# Patient Record
Sex: MALE | Race: BLACK OR AFRICAN AMERICAN | NOT HISPANIC OR LATINO | URBAN - METROPOLITAN AREA
[De-identification: names, ages, dates, MRNs, and addresses within clinical notes are randomized per-mention and may not be internally consistent; named-entity substitution may affect disease eponyms.]

---

## 2023-01-01 ENCOUNTER — HOSPITAL ENCOUNTER (EMERGENCY)
Facility: HOSPITAL | Age: 38
End: 2023-12-21
Attending: EMERGENCY MEDICINE
Payer: MEDICAID

## 2023-01-01 DIAGNOSIS — I46.9 CARDIAC ARREST (MULTI): Primary | ICD-10-CM

## 2023-01-01 LAB
ANION GAP BLDV CALCULATED.4IONS-SCNC: 25 MMOL/L (ref 10–25)
BASE EXCESS BLDV CALC-SCNC: -20 MMOL/L (ref -2–3)
BODY TEMPERATURE: 37 DEGREES CELSIUS
CA-I BLDV-SCNC: 1.17 MMOL/L (ref 1.1–1.33)
CHLORIDE BLDV-SCNC: 99 MMOL/L (ref 98–107)
GLUCOSE BLDV-MCNC: 417 MG/DL (ref 74–99)
HCO3 BLDV-SCNC: 15.4 MMOL/L (ref 22–26)
HCT VFR BLD EST: 44 % (ref 41–52)
HGB BLDV-MCNC: 14.8 G/DL (ref 13.5–17.5)
LACTATE BLDV-SCNC: 14.7 MMOL/L (ref 0.4–2)
OXYHGB MFR BLDV: 12.2 % (ref 45–75)
PCO2 BLDV: 88 MM HG (ref 41–51)
PH BLDV: 6.85 PH (ref 7.33–7.43)
PO2 BLDV: 21 MM HG (ref 35–45)
POTASSIUM BLDV-SCNC: 3 MMOL/L (ref 3.5–5.3)
SAO2 % BLDV: 13 % (ref 45–75)
SODIUM BLDV-SCNC: 136 MMOL/L (ref 136–145)

## 2023-01-01 PROCEDURE — 99285 EMERGENCY DEPT VISIT HI MDM: CPT | Performed by: EMERGENCY MEDICINE

## 2023-01-01 PROCEDURE — 2500000004 HC RX 250 GENERAL PHARMACY W/ HCPCS (ALT 636 FOR OP/ED): Performed by: STUDENT IN AN ORGANIZED HEALTH CARE EDUCATION/TRAINING PROGRAM

## 2023-01-01 PROCEDURE — 84132 ASSAY OF SERUM POTASSIUM: CPT

## 2023-01-01 PROCEDURE — 99291 CRITICAL CARE FIRST HOUR: CPT | Mod: 25 | Performed by: EMERGENCY MEDICINE

## 2023-01-01 PROCEDURE — 94681 O2 UPTK CO2 OUTP % O2 XTRC: CPT

## 2023-01-01 PROCEDURE — 92950 HEART/LUNG RESUSCITATION CPR: CPT | Performed by: EMERGENCY MEDICINE

## 2023-01-01 PROCEDURE — 94762 N-INVAS EAR/PLS OXIMTRY CONT: CPT

## 2023-01-01 RX ORDER — INDOMETHACIN 25 MG/1
CAPSULE ORAL CODE/TRAUMA/SEDATION MEDICATION
Status: COMPLETED | OUTPATIENT
Start: 2023-01-01 | End: 2023-01-01

## 2023-01-01 RX ORDER — EPINEPHRINE 0.1 MG/ML
INJECTION INTRACARDIAC; INTRAVENOUS CODE/TRAUMA/SEDATION MEDICATION
Status: COMPLETED | OUTPATIENT
Start: 2023-01-01 | End: 2023-01-01

## 2023-01-01 RX ADMIN — SODIUM BICARBONATE 50 MEQ: 84 INJECTION, SOLUTION INTRAVENOUS at 06:32

## 2023-01-01 RX ADMIN — EPINEPHRINE 1 MG: 0.1 INJECTION INTRAVENOUS at 06:26

## 2023-01-01 RX ADMIN — EPINEPHRINE 1 MG: 0.1 INJECTION INTRAVENOUS at 06:31

## 2023-12-21 NOTE — ED TRIAGE NOTES
Pt presents as a full arrest - per EMS pt was called to 9-1-1 as a diabetic unresponsive emergency. Upon their arrival patient was agonal breathing and unresponsive in asystole. Pt was intubated 7.5 @ 23cm, 18g RAC,

## 2023-12-21 NOTE — ED PROVIDER NOTES
HPI:  Domingo Alfonso is a 38 y.o. who is brought in by EMS in cardiac arrest. Per EMS report, initially received a call for an unresponsive person.  Patient was in full arrest on their arrival which was approximately 30 minutes after the initial call.  Per EMS report, ACLS was initiated with chest compressions and 4 rounds of epinephrine and patient intubated in the field.  Remained in asystole throughout transport.  No additional information was able to be obtained.    Additional history was limited due to patient in cardiac arrest and no immediate family present    PMHx: unknown  PSHx: unknown   Social Hx: unknown  Allergies: unknown    ROS:   Patient unable to provide due to clinical condition    EXAM:  - Gen: unconscious, nonresponsive to noxious stimuli  - HEENT: NCAT; pupils fixed/dilated; neck is soft/supple; ET tube in place, dried vomitus around the mouth.  - CV: Chest compressions are ongoing, >2 sec cap refill bilateral upper extremities  - Pulm: +BVM with fair air exchange bilaterally, no spontaneous respirations  - GI: no bruising, no ecchymosis  - : not obtainable  - Extremities: no BLE edema  - Skin: warm, dry,   - Neuro: GCS 3, not obtainable    Diagnoses as of 12/21/23 0745   Cardiac arrest (CMS/[facility])         ED COURSE / MDM:   This is a 38 y.o. male presenting to the ED in cardiac arrest. ACLS was continued here in the ED. patient remained in PEA versus asystole with no cardiac activity on point-of-care ultrasound.  End-tidal CO2 confirmed intubation and remained approximately 10 throughout the code.  Patient had no signs of cardiac tamponade, or tension pneumothorax.  Patient not hypothermic.  No signs of dialysis access and potassium normal on venous blood gas.  Patient had significant acidosis with a pH of 6.85.  Patient remained in asystole with no reversible causes found. Time of death called at 6:35 AM.    Clinical Impression:  -- Full cardiopulmonary arrest    Patient seen and discussed with  attending physician.    Harper Smith MD  Emergency Medicine          Harper Smith MD  Resident  12/21/23 0643    Procedure  Critical Care    Performed by: Haja Hamilton MD  Authorized by: Flavio Viramontes MD    Critical care provider statement:     Critical care time (minutes):  30    Critical care time was exclusive of:  Separately billable procedures and treating other patients and teaching time    Critical care was necessary to treat or prevent imminent or life-threatening deterioration of the following conditions:  Cardiac failure    Critical care was time spent personally by me on the following activities:  Evaluation of patient's response to treatment, examination of patient, interpretation of cardiac output measurements, ordering and performing treatments and interventions, ordering and review of laboratory studies and re-evaluation of patient's condition    The patient was seen by the resident/fellow.  I have personally performed a substantive portion of the encounter.  I have seen and examined the patient; agree with the workup, evaluation, MDM, management and diagnosis.  The care plan has been discussed with the resident; I have reviewed the resident’s note and agree with the documented findings.               Haja Hamilton MD  12/21/23 0728       Harper Smith MD  Resident  12/21/23 0746

## 2023-12-22 NOTE — PROGRESS NOTES
Patient  prior to my arrival.  Previous providers were unable to reach family members to update.    I attempted to reach patient's mother (Jumana Pemberton) at [phone] however number is disconnected.  I subsequently attempted to reach patient's partner (April) at [phone] unsuccessfully.